# Patient Record
Sex: MALE | Race: WHITE | NOT HISPANIC OR LATINO | URBAN - METROPOLITAN AREA
[De-identification: names, ages, dates, MRNs, and addresses within clinical notes are randomized per-mention and may not be internally consistent; named-entity substitution may affect disease eponyms.]

---

## 2022-08-24 ENCOUNTER — APPOINTMENT (RX ONLY)
Dept: URBAN - METROPOLITAN AREA CLINIC 303 | Facility: CLINIC | Age: 42
Setting detail: DERMATOLOGY
End: 2022-08-24

## 2022-08-24 DIAGNOSIS — L43.8 OTHER LICHEN PLANUS: ICD-10-CM

## 2022-08-24 PROCEDURE — ? SCREENING FOR COVID-19

## 2022-08-24 PROCEDURE — ? COUNSELING

## 2022-08-24 PROCEDURE — 99203 OFFICE O/P NEW LOW 30 MIN: CPT

## 2022-08-24 PROCEDURE — ? ADDITIONAL NOTES

## 2022-08-24 PROCEDURE — ? PRESCRIPTION

## 2022-08-24 RX ORDER — PHARMACY COMPOUNDING ACCESSORY
EACH MISCELLANEOUS
Qty: 390 | Refills: 6 | Status: ERX | COMMUNITY
Start: 2022-08-24

## 2022-08-24 RX ORDER — TRIAMCINOLONE ACETONIDE 1 MG/G
PASTE DENTAL
Qty: 5 | Refills: 6 | Status: ERX | COMMUNITY
Start: 2022-08-24

## 2022-08-24 RX ADMIN — TRIAMCINOLONE ACETONIDE: 1 PASTE DENTAL at 00:00

## 2022-08-24 RX ADMIN — Medication: at 00:00

## 2022-08-24 ASSESSMENT — LOCATION ZONE DERM: LOCATION ZONE: MUCOUS_MEMBRANE

## 2022-08-24 ASSESSMENT — LOCATION SIMPLE DESCRIPTION DERM
LOCATION SIMPLE: LEFT BUCCAL MUCOSA
LOCATION SIMPLE: RIGHT BUCCAL MUCOSA

## 2022-08-24 ASSESSMENT — LOCATION DETAILED DESCRIPTION DERM
LOCATION DETAILED: RIGHT BUCCAL MUCOSA
LOCATION DETAILED: LEFT BUCCAL MUCOSA

## 2022-08-24 NOTE — PROCEDURE: ADDITIONAL NOTES
Additional Notes: Diagnosed by periodontist, biopsy results scanned into chart. Follow up if get worse or becomes painful, bleeding or changes
Render Risk Assessment In Note?: no
Detail Level: Detailed

## 2022-08-24 NOTE — PROCEDURE: SCREENING FOR COVID-19
Has The Patient Been Vaccinated For Covid-19?: Yes
Detail Level: Simple
Which Covid 19 Vaccine Did Patient Receive (Optional)?: Pfizer
Previous Infection Text: The patient has recovered from a previous COVID-19 infection.
Has The Patient Been Infected With Covid-19 In The Past?: No

## 2022-08-24 NOTE — PROCEDURE: COUNSELING
Pt changed of depends, pt wife to  prescriptions and to meet ambulance at home.    Detail Level: Detailed